# Patient Record
Sex: FEMALE | Race: BLACK OR AFRICAN AMERICAN | URBAN - METROPOLITAN AREA
[De-identification: names, ages, dates, MRNs, and addresses within clinical notes are randomized per-mention and may not be internally consistent; named-entity substitution may affect disease eponyms.]

---

## 2022-07-06 ENCOUNTER — OFFICE VISIT (OUTPATIENT)
Dept: URBAN - METROPOLITAN AREA CLINIC 25 | Facility: CLINIC | Age: 62
End: 2022-07-06
Payer: COMMERCIAL

## 2022-07-06 ENCOUNTER — WEB ENCOUNTER (OUTPATIENT)
Dept: URBAN - METROPOLITAN AREA CLINIC 25 | Facility: CLINIC | Age: 62
End: 2022-07-06

## 2022-07-06 VITALS
WEIGHT: 286 LBS | BODY MASS INDEX: 56.15 KG/M2 | DIASTOLIC BLOOD PRESSURE: 83 MMHG | HEIGHT: 60 IN | HEART RATE: 75 BPM | SYSTOLIC BLOOD PRESSURE: 133 MMHG | TEMPERATURE: 97.3 F

## 2022-07-06 DIAGNOSIS — R13.14 PHARYNGOESOPHAGEAL DYSPHAGIA: ICD-10-CM

## 2022-07-06 DIAGNOSIS — R68.81 EARLY SATIETY: ICD-10-CM

## 2022-07-06 DIAGNOSIS — R11.2 NAUSEA AND VOMITING: ICD-10-CM

## 2022-07-06 PROCEDURE — 99204 OFFICE O/P NEW MOD 45 MIN: CPT | Performed by: INTERNAL MEDICINE

## 2022-07-06 RX ORDER — IBUPROFEN 800 MG/1
TAKE 1 TABLET BY MOUTH EVERY 8 HOURS FOR 90 DAYS TABLET, FILM COATED ORAL
Qty: 90 EACH | Refills: 0 | Status: ACTIVE | COMMUNITY

## 2022-07-06 RX ORDER — QUETIAPINE FUMARATE 100 MG/1
TAKE 1 TO 2 TABLETS BY MOUTH EVERY DAY AT BEDTIME AS NEEDED FOR SLEEP TABLET ORAL
Qty: 60 EACH | Refills: 0 | Status: ACTIVE | COMMUNITY

## 2022-07-06 RX ORDER — HYDROCHLOROTHIAZIDE 25 MG/1
TAKE 1 TABLET BY MOUTH ONCE DAILY IN THE MORNING TABLET ORAL
Qty: 30 EACH | Refills: 0 | Status: ACTIVE | COMMUNITY

## 2022-07-06 RX ORDER — ESOMEPRAZOLE MAGNESIUM 40 MG/1
1 CAPSULE CAPSULE, DELAYED RELEASE ORAL ONCE A DAY
Qty: 30 | Status: ACTIVE | COMMUNITY
Start: 2022-07-06

## 2022-07-06 NOTE — HPI-TODAY'S VISIT:
The patient was referred by Dr. Funmi Onofre for GI complaints.   A copy of this document is being forwarded to the referring provider.  She has an erratic appetite.  She is losing weight.  She does have intermittent N/V.  She has intermittent GERD.  She denies abdominal pain.  She has intermittent dysphagia to solids.  She has early saitety.  She had a agstric band in 2003. The band is still present.  She denies LGI symptoms.  She denies LGI bleed or melena.  She takes Ibuproen on a regular basis for a few months.  Her present symptoms have been present since a 45 day hospitalization for Neurosyphillis.  The N/V does decrease slightly with the Nexium.  She has not had EGD or colonoscopy

## 2022-07-13 PROBLEM — 40739000 DYSPHAGIA: Status: ACTIVE | Noted: 2022-07-06

## 2022-07-27 ENCOUNTER — OFFICE VISIT (OUTPATIENT)
Dept: URBAN - METROPOLITAN AREA MEDICAL CENTER 8 | Facility: MEDICAL CENTER | Age: 62
End: 2022-07-27
Payer: COMMERCIAL

## 2022-07-27 ENCOUNTER — TELEPHONE ENCOUNTER (OUTPATIENT)
Dept: URBAN - METROPOLITAN AREA CLINIC 92 | Facility: CLINIC | Age: 62
End: 2022-07-27

## 2022-07-27 DIAGNOSIS — R13.19 CERVICAL DYSPHAGIA: ICD-10-CM

## 2022-07-27 DIAGNOSIS — K22.89 DILATATION OF ESOPHAGUS: ICD-10-CM

## 2022-07-27 DIAGNOSIS — R11.2 ACUTE NAUSEA WITH NONBILIOUS VOMITING: ICD-10-CM

## 2022-07-27 DIAGNOSIS — K31.89 ACQUIRED DEFORMITY OF DUODENUM: ICD-10-CM

## 2022-07-27 PROCEDURE — 43239 EGD BIOPSY SINGLE/MULTIPLE: CPT | Performed by: INTERNAL MEDICINE

## 2022-07-27 RX ORDER — ESOMEPRAZOLE MAGNESIUM 40 MG/1
1 CAPSULE CAPSULE, DELAYED RELEASE ORAL ONCE A DAY
Qty: 30 | Status: ACTIVE | COMMUNITY
Start: 2022-07-06

## 2022-07-27 RX ORDER — HYDROCHLOROTHIAZIDE 25 MG/1
TAKE 1 TABLET BY MOUTH ONCE DAILY IN THE MORNING TABLET ORAL
Qty: 30 EACH | Refills: 0 | Status: ACTIVE | COMMUNITY

## 2022-07-27 RX ORDER — ESOMEPRAZOLE MAGNESIUM 40 MG/1
1 CAPSULE CAPSULE, DELAYED RELEASE ORAL TWICE A DAY
Qty: 60 CAPSULE | Refills: 5 | OUTPATIENT
Start: 2022-07-27

## 2022-07-27 RX ORDER — QUETIAPINE FUMARATE 100 MG/1
TAKE 1 TO 2 TABLETS BY MOUTH EVERY DAY AT BEDTIME AS NEEDED FOR SLEEP TABLET ORAL
Qty: 60 EACH | Refills: 0 | Status: ACTIVE | COMMUNITY

## 2022-07-27 RX ORDER — IBUPROFEN 800 MG/1
TAKE 1 TABLET BY MOUTH EVERY 8 HOURS FOR 90 DAYS TABLET, FILM COATED ORAL
Qty: 90 EACH | Refills: 0 | Status: ACTIVE | COMMUNITY

## 2022-07-27 RX ORDER — POLYETHYLENE GLYCOL 3350, SODIUM CHLORIDE, SODIUM BICARBONATE, POTASSIUM CHLORIDE 420; 11.2; 5.72; 1.48 G/4L; G/4L; G/4L; G/4L
AS DIRECTED POWDER, FOR SOLUTION ORAL ONCE
Qty: 1 BOTTLE | Refills: 0 | OUTPATIENT
Start: 2022-07-27 | End: 2022-07-28

## 2022-07-28 ENCOUNTER — P2P PATIENT RECORD (OUTPATIENT)
Age: 62
End: 2022-07-28

## 2022-07-29 ENCOUNTER — TELEPHONE ENCOUNTER (OUTPATIENT)
Dept: URBAN - METROPOLITAN AREA CLINIC 96 | Facility: CLINIC | Age: 62
End: 2022-07-29

## 2022-08-24 ENCOUNTER — TELEPHONE ENCOUNTER (OUTPATIENT)
Dept: URBAN - METROPOLITAN AREA CLINIC 25 | Facility: CLINIC | Age: 62
End: 2022-08-24

## 2022-08-24 RX ORDER — ESOMEPRAZOLE MAGNESIUM 40 MG/1
1 CAPSULE CAPSULE, DELAYED RELEASE ORAL ONCE A DAY
Qty: 30 | Status: ACTIVE | COMMUNITY
Start: 2022-07-06

## 2022-08-24 RX ORDER — OMEPRAZOLE 40 MG/1
1 CAPSULE 30 MINUTES BEFORE MORNING MEAL CAPSULE, DELAYED RELEASE ORAL TWICE A DAY
Qty: 60 | Refills: 5 | OUTPATIENT
Start: 2022-08-26

## 2022-08-24 RX ORDER — HYDROCHLOROTHIAZIDE 25 MG/1
TAKE 1 TABLET BY MOUTH ONCE DAILY IN THE MORNING TABLET ORAL
Qty: 30 EACH | Refills: 0 | Status: ACTIVE | COMMUNITY

## 2022-08-24 RX ORDER — IBUPROFEN 800 MG/1
TAKE 1 TABLET BY MOUTH EVERY 8 HOURS FOR 90 DAYS TABLET, FILM COATED ORAL
Qty: 90 EACH | Refills: 0 | Status: ACTIVE | COMMUNITY

## 2022-08-24 RX ORDER — QUETIAPINE FUMARATE 100 MG/1
TAKE 1 TO 2 TABLETS BY MOUTH EVERY DAY AT BEDTIME AS NEEDED FOR SLEEP TABLET ORAL
Qty: 60 EACH | Refills: 0 | Status: ACTIVE | COMMUNITY

## 2022-08-24 RX ORDER — ESOMEPRAZOLE MAGNESIUM 40 MG/1
1 CAPSULE CAPSULE, DELAYED RELEASE ORAL TWICE A DAY
Qty: 60 CAPSULE | Refills: 5 | Status: ACTIVE | COMMUNITY
Start: 2022-07-27

## 2022-09-01 ENCOUNTER — OFFICE VISIT (OUTPATIENT)
Dept: URBAN - METROPOLITAN AREA MEDICAL CENTER 17 | Facility: MEDICAL CENTER | Age: 62
End: 2022-09-01

## 2022-09-01 ENCOUNTER — DASHBOARD ENCOUNTERS (OUTPATIENT)
Age: 62
End: 2022-09-01

## 2022-09-06 ENCOUNTER — WEB ENCOUNTER (OUTPATIENT)
Dept: URBAN - METROPOLITAN AREA CLINIC 84 | Facility: CLINIC | Age: 62
End: 2022-09-06

## 2022-09-08 ENCOUNTER — OFFICE VISIT (OUTPATIENT)
Dept: URBAN - METROPOLITAN AREA CLINIC 84 | Facility: CLINIC | Age: 62
End: 2022-09-08
Payer: COMMERCIAL

## 2022-09-08 ENCOUNTER — TELEPHONE ENCOUNTER (OUTPATIENT)
Dept: URBAN - METROPOLITAN AREA CLINIC 84 | Facility: CLINIC | Age: 62
End: 2022-09-08

## 2022-09-08 VITALS
HEIGHT: 60 IN | HEART RATE: 72 BPM | BODY MASS INDEX: 56.54 KG/M2 | SYSTOLIC BLOOD PRESSURE: 148 MMHG | WEIGHT: 288 LBS | TEMPERATURE: 97.7 F | DIASTOLIC BLOOD PRESSURE: 76 MMHG

## 2022-09-08 DIAGNOSIS — K44.9 HIATAL HERNIA: ICD-10-CM

## 2022-09-08 DIAGNOSIS — R11.2 NAUSEA AND VOMITING: ICD-10-CM

## 2022-09-08 DIAGNOSIS — K21.9 GERD: ICD-10-CM

## 2022-09-08 DIAGNOSIS — Z12.11 SCREENING COLONOSCOPY: ICD-10-CM

## 2022-09-08 PROCEDURE — 99214 OFFICE O/P EST MOD 30 MIN: CPT | Performed by: INTERNAL MEDICINE

## 2022-09-08 RX ORDER — QUETIAPINE FUMARATE 100 MG/1
TAKE 1 TO 2 TABLETS BY MOUTH EVERY DAY AT BEDTIME AS NEEDED FOR SLEEP TABLET ORAL
Qty: 60 EACH | Refills: 0 | Status: ACTIVE | COMMUNITY

## 2022-09-08 RX ORDER — IBUPROFEN 800 MG/1
TAKE 1 TABLET BY MOUTH EVERY 8 HOURS FOR 90 DAYS TABLET, FILM COATED ORAL
Qty: 90 EACH | Refills: 0 | Status: ACTIVE | COMMUNITY

## 2022-09-08 RX ORDER — POLYETHYLENE GLYCOL 3350, SODIUM CHLORIDE, SODIUM BICARBONATE, POTASSIUM CHLORIDE 420; 11.2; 5.72; 1.48 G/4L; G/4L; G/4L; G/4L
AS DIRECTED POWDER, FOR SOLUTION ORAL ONCE
Qty: 1 BOTTLE | Refills: 0 | OUTPATIENT
Start: 2022-09-08 | End: 2022-09-09

## 2022-09-08 RX ORDER — HYDROCHLOROTHIAZIDE 25 MG/1
TAKE 1 TABLET BY MOUTH ONCE DAILY IN THE MORNING TABLET ORAL
Qty: 30 EACH | Refills: 0 | Status: ACTIVE | COMMUNITY

## 2022-09-08 RX ORDER — OMEPRAZOLE 40 MG/1
1 CAPSULE 30 MINUTES BEFORE MORNING MEAL CAPSULE, DELAYED RELEASE ORAL TWICE A DAY
Qty: 60 | Refills: 5 | Status: ACTIVE | COMMUNITY
Start: 2022-08-26

## 2022-09-08 RX ORDER — ESOMEPRAZOLE MAGNESIUM 40 MG/1
1 CAPSULE CAPSULE, DELAYED RELEASE ORAL TWICE A DAY
Qty: 60 CAPSULE | Refills: 5 | Status: ACTIVE | COMMUNITY
Start: 2022-07-27

## 2022-09-08 RX ORDER — ESOMEPRAZOLE MAGNESIUM 40 MG/1
1 CAPSULE CAPSULE, DELAYED RELEASE ORAL ONCE A DAY
Qty: 30 | Status: ACTIVE | COMMUNITY
Start: 2022-07-06

## 2022-09-08 NOTE — HPI-TODAY'S VISIT:
EGD in 7/2022 had agstritis and a large HH in the settting of a agstric band.  We increased the NExium to BID.  She did not get the colonoscopy that was ordered.  She does feel better on the Nexium BID.  SHe denies recurrent N/V . She denies significant supine regurgitation.   She denies heartburn.  She denies dysphagia.  She denies abdominal pain.  SHe denies anroxia or weight loss.  SHe denies LGI symptoms.  SHe denies LGI bleed or melena.  She is satisfied wih her present symptoms

## 2022-09-15 PROBLEM — 235595009 GASTROESOPHAGEAL REFLUX DISEASE: Status: ACTIVE | Noted: 2022-07-27

## 2022-09-30 ENCOUNTER — TELEPHONE ENCOUNTER (OUTPATIENT)
Dept: URBAN - METROPOLITAN AREA CLINIC 84 | Facility: CLINIC | Age: 62
End: 2022-09-30

## 2022-09-30 RX ORDER — ESOMEPRAZOLE MAGNESIUM 40 MG/1
1 CAPSULE CAPSULE, DELAYED RELEASE ORAL TWICE A DAY
Qty: 60 CAPSULE | Refills: 5
Start: 2022-07-06

## 2022-10-07 PROBLEM — 444783004 SCREENING COLONOSCOPY: Status: ACTIVE | Noted: 2022-08-30

## 2022-10-21 ENCOUNTER — TELEPHONE ENCOUNTER (OUTPATIENT)
Dept: URBAN - METROPOLITAN AREA CLINIC 92 | Facility: CLINIC | Age: 62
End: 2022-10-21

## 2022-10-21 ENCOUNTER — OFFICE VISIT (OUTPATIENT)
Dept: URBAN - METROPOLITAN AREA MEDICAL CENTER 17 | Facility: MEDICAL CENTER | Age: 62
End: 2022-10-21
Payer: COMMERCIAL

## 2022-10-21 DIAGNOSIS — Z12.11 COLON CANCER SCREENING: ICD-10-CM

## 2022-10-21 PROCEDURE — 45378 DIAGNOSTIC COLONOSCOPY: CPT | Performed by: INTERNAL MEDICINE

## 2022-10-21 RX ORDER — ESOMEPRAZOLE MAGNESIUM 40 MG/1
1 CAPSULE CAPSULE, DELAYED RELEASE ORAL TWICE A DAY
Qty: 60 CAPSULE | Refills: 5
Start: 2022-07-27

## 2022-10-21 RX ORDER — HYDROCHLOROTHIAZIDE 25 MG/1
TAKE 1 TABLET BY MOUTH ONCE DAILY IN THE MORNING TABLET ORAL
Qty: 30 EACH | Refills: 0 | Status: ACTIVE | COMMUNITY

## 2022-10-21 RX ORDER — QUETIAPINE FUMARATE 100 MG/1
TAKE 1 TO 2 TABLETS BY MOUTH EVERY DAY AT BEDTIME AS NEEDED FOR SLEEP TABLET ORAL
Qty: 60 EACH | Refills: 0 | Status: ACTIVE | COMMUNITY

## 2022-10-21 RX ORDER — ESOMEPRAZOLE MAGNESIUM 40 MG/1
1 CAPSULE CAPSULE, DELAYED RELEASE ORAL TWICE A DAY
Qty: 60 CAPSULE | Refills: 5 | Status: ACTIVE | COMMUNITY
Start: 2022-07-06

## 2022-10-21 RX ORDER — ESOMEPRAZOLE MAGNESIUM 40 MG/1
1 CAPSULE CAPSULE, DELAYED RELEASE ORAL TWICE A DAY
Qty: 60 CAPSULE | Refills: 5 | Status: ACTIVE | COMMUNITY
Start: 2022-07-27

## 2022-10-21 RX ORDER — OMEPRAZOLE 40 MG/1
1 CAPSULE 30 MINUTES BEFORE MORNING MEAL CAPSULE, DELAYED RELEASE ORAL TWICE A DAY
Qty: 60 | Refills: 5 | Status: ACTIVE | COMMUNITY
Start: 2022-08-26

## 2022-10-21 RX ORDER — IBUPROFEN 800 MG/1
TAKE 1 TABLET BY MOUTH EVERY 8 HOURS FOR 90 DAYS TABLET, FILM COATED ORAL
Qty: 90 EACH | Refills: 0 | Status: ACTIVE | COMMUNITY

## 2022-10-24 ENCOUNTER — ERX REFILL RESPONSE (OUTPATIENT)
Dept: URBAN - METROPOLITAN AREA CLINIC 48 | Facility: CLINIC | Age: 62
End: 2022-10-24

## 2022-10-24 RX ORDER — ESOMEPRAZOLE MAGNESIUM 40 MG/1
1 CAPSULE CAPSULE, DELAYED RELEASE ORAL TWICE A DAY
Qty: 60 CAPSULE | Refills: 5 | OUTPATIENT

## 2023-01-12 ENCOUNTER — TELEPHONE ENCOUNTER (OUTPATIENT)
Dept: URBAN - METROPOLITAN AREA CLINIC 25 | Facility: CLINIC | Age: 63
End: 2023-01-12

## 2023-03-21 ENCOUNTER — OFFICE VISIT (OUTPATIENT)
Dept: URBAN - METROPOLITAN AREA CLINIC 25 | Facility: CLINIC | Age: 63
End: 2023-03-21

## 2023-03-21 RX ORDER — ESOMEPRAZOLE MAGNESIUM 40 MG/1
1 CAPSULE CAPSULE, DELAYED RELEASE ORAL TWICE A DAY
Qty: 60 CAPSULE | Refills: 5 | Status: ACTIVE | COMMUNITY

## 2023-03-21 RX ORDER — QUETIAPINE FUMARATE 100 MG/1
TAKE 1 TO 2 TABLETS BY MOUTH EVERY DAY AT BEDTIME AS NEEDED FOR SLEEP TABLET ORAL
Qty: 60 EACH | Refills: 0 | Status: ACTIVE | COMMUNITY

## 2023-03-21 RX ORDER — OMEPRAZOLE 40 MG/1
1 CAPSULE 30 MINUTES BEFORE MORNING MEAL CAPSULE, DELAYED RELEASE ORAL TWICE A DAY
Qty: 60 | Refills: 5 | Status: ACTIVE | COMMUNITY
Start: 2022-08-26

## 2023-03-21 RX ORDER — IBUPROFEN 800 MG/1
TAKE 1 TABLET BY MOUTH EVERY 8 HOURS FOR 90 DAYS TABLET, FILM COATED ORAL
Qty: 90 EACH | Refills: 0 | Status: ACTIVE | COMMUNITY

## 2023-03-21 RX ORDER — HYDROCHLOROTHIAZIDE 25 MG/1
TAKE 1 TABLET BY MOUTH ONCE DAILY IN THE MORNING TABLET ORAL
Qty: 30 EACH | Refills: 0 | Status: ACTIVE | COMMUNITY